# Patient Record
Sex: MALE | Race: WHITE | Employment: OTHER | URBAN - METROPOLITAN AREA
[De-identification: names, ages, dates, MRNs, and addresses within clinical notes are randomized per-mention and may not be internally consistent; named-entity substitution may affect disease eponyms.]

---

## 2017-09-21 ENCOUNTER — OFFICE VISIT (OUTPATIENT)
Dept: HEMATOLOGY | Age: 67
End: 2017-09-21

## 2017-09-21 ENCOUNTER — HOSPITAL ENCOUNTER (OUTPATIENT)
Dept: LAB | Age: 67
Discharge: HOME | End: 2017-09-21
Payer: COMMERCIAL

## 2017-09-21 VITALS
WEIGHT: 247 LBS | HEART RATE: 74 BPM | BODY MASS INDEX: 34.58 KG/M2 | TEMPERATURE: 96.4 F | SYSTOLIC BLOOD PRESSURE: 122 MMHG | OXYGEN SATURATION: 96 % | DIASTOLIC BLOOD PRESSURE: 77 MMHG | HEIGHT: 71 IN | RESPIRATION RATE: 16 BRPM

## 2017-09-21 DIAGNOSIS — B18.2 CHRONIC HEPATITIS C WITHOUT HEPATIC COMA (HCC): ICD-10-CM

## 2017-09-21 DIAGNOSIS — B18.2 CHRONIC HEPATITIS C WITHOUT HEPATIC COMA (HCC): Primary | ICD-10-CM

## 2017-09-21 LAB
ALBUMIN SERPL-MCNC: 3.7 G/DL (ref 3.4–5)
ALBUMIN/GLOB SERPL: 1 {RATIO} (ref 0.8–1.7)
ALP SERPL-CCNC: 82 U/L (ref 45–117)
ALT SERPL-CCNC: 34 U/L (ref 16–61)
AST SERPL-CCNC: 21 U/L (ref 15–37)
BASOPHILS # BLD: 0 K/UL (ref 0–0.06)
BASOPHILS NFR BLD: 1 % (ref 0–2)
BILIRUB DIRECT SERPL-MCNC: 0.1 MG/DL (ref 0–0.2)
BILIRUB SERPL-MCNC: 0.3 MG/DL (ref 0.2–1)
DIFFERENTIAL METHOD BLD: ABNORMAL
EOSINOPHIL # BLD: 0.1 K/UL (ref 0–0.4)
EOSINOPHIL NFR BLD: 2 % (ref 0–5)
ERYTHROCYTE [DISTWIDTH] IN BLOOD BY AUTOMATED COUNT: 12.2 % (ref 11.6–14.5)
GLOBULIN SER CALC-MCNC: 3.7 G/DL (ref 2–4)
HCT VFR BLD AUTO: 42.5 % (ref 36–48)
HGB BLD-MCNC: 14.3 G/DL (ref 13–16)
LYMPHOCYTES # BLD: 2.2 K/UL (ref 0.9–3.6)
LYMPHOCYTES NFR BLD: 34 % (ref 21–52)
MCH RBC QN AUTO: 32.2 PG (ref 24–34)
MCHC RBC AUTO-ENTMCNC: 33.6 G/DL (ref 31–37)
MCV RBC AUTO: 95.7 FL (ref 74–97)
MONOCYTES # BLD: 0.7 K/UL (ref 0.05–1.2)
MONOCYTES NFR BLD: 10 % (ref 3–10)
NEUTS SEG # BLD: 3.5 K/UL (ref 1.8–8)
NEUTS SEG NFR BLD: 53 % (ref 40–73)
PLATELET # BLD AUTO: 258 K/UL (ref 135–420)
PMV BLD AUTO: 10.3 FL (ref 9.2–11.8)
PROT SERPL-MCNC: 7.4 G/DL (ref 6.4–8.2)
RBC # BLD AUTO: 4.44 M/UL (ref 4.7–5.5)
WBC # BLD AUTO: 6.4 K/UL (ref 4.6–13.2)

## 2017-09-21 PROCEDURE — 36415 COLL VENOUS BLD VENIPUNCTURE: CPT | Performed by: NURSE PRACTITIONER

## 2017-09-21 PROCEDURE — 87522 HEPATITIS C REVRS TRNSCRPJ: CPT | Performed by: NURSE PRACTITIONER

## 2017-09-21 PROCEDURE — 80076 HEPATIC FUNCTION PANEL: CPT | Performed by: NURSE PRACTITIONER

## 2017-09-21 PROCEDURE — 85025 COMPLETE CBC W/AUTO DIFF WBC: CPT | Performed by: NURSE PRACTITIONER

## 2017-09-21 NOTE — PROGRESS NOTES
[address] Ewing MD, [place], [place], [place]       Eliza Sprain, NP Gerome Halsted, MILADY Ke[place], [facility]   CASSANDRA Marcos NP        at [address], [address] [address]     [phone]     FAX: [phone]    at [address], [address] [place] [address], [address]     [phone]     FAX: [phone]         Patient Care Team:  Kitty Coronado MD as PCP - General (Family Practice)      Problem List  Date Reviewed: 12/22/2016          Codes Class Noted    Hypertension ICD-10-CM: I10  ICD-9-CM: 401.9  5/10/2016        S/P cholecystectomy ICD-10-CM: Z90.49  ICD-9-CM: V45.79  5/10/2016        HCV (hepatitis C virus) ICD-10-CM: B19.20  ICD-9-CM: 070.70  Unknown    Overview Signed 7/11/2011  2:26 PM by Justin Forrester     Chronic HCV, genotype 1                     Mr. Stephanie Watson returns to the [address] regarding chronic HCV and its management. His active problem list, all pertinent past medical history, medications, liver histology, endoscopic studies, radiologic findings and laboratory findings related to the liver disorder were reviewed with him. The patient is a 73 y.o.  male who was noted to have abnormalities in liver chemistries and subsequently tested positive for chronic HCV in 1990s. The patient was previously seen and treated for HCV in 2011-12. Risk factors for acquiring HCV are IV drug use 1970s. There was no history of acute incteric hepatitis at the time of these risk factors. Several liver biopsies was performed. The last was in 12/2012. This demonstrated no - portal fibrosis. The patient was treated in a clinical trial with peginterferon, ribavirin and an experimental anti-viral agent or possible placebo.     The most recent laboratory studies indicate that the liver transaminases are normal, alkaline phosphatase is normal, tests of hepatic synthetic and metabolic function are normal, and the platelet count is normal.     The patient completed twelve weeks of treatment with Norwood Copper with good toleration on 9/11/16. Treatment start date: 6/20/16. He did not note any side effects and denies missing any doses of medication. The patient has no symptoms which can be attributed to the liver disorder. The patient has moderate limitations in functional activities which can be attributed to other medical problems that are not related to the liver disease. The patient has not experienced fatigue, fevers, chills, shortness of breath, chest pain, pain in the right side over the liver, diffuse abdominal pain, nausea, vomiting, constipation, diarrhrea, dry eyes, dry mouth, arthralgias, myalgias, yellowing of the eyes or skin, itching, dark urine, problems concentrating, swelling of the abdomen, swelling of the lower extremities, hematemesis, or hematochezia. ALLERGIES  No Known Allergies    MEDICATIONS  Current Outpatient Prescriptions   Medication Sig    QUINAPRIL HCL (QUINAPRIL PO) Take  by mouth.  ASPIRIN PO Take  by mouth. No current facility-administered medications for this visit. SYSTEM REVIEW NOT RELATED TO LIVER DISEASE OR REVIEWED ABOVE:  Constitution systems: Negative for fever, chills, weight gain, weight loss. Eyes: Negative for visual changes. ENT: Negative for sore throat, painful swallowing. Respiratory: Negative for cough, hemoptysis, SOB. Cardiology: Negative for chest pain, palpitations. GI:  Negative for constipation or diarrhea. : Negative for urinary frequency, dysuria, hematuria, nocturia. Skin: Negative for rash. Hematology: Negative for easy bruising, blood clots. Musculo-skelatal: Knee pain. Neurologic: Negative for headaches, dizziness, vertigo, memory problems not related to HE.   Psychology: Negative for anxiety, depression. FAMILY HISTORY:  The father had head and neck and prostate cancer. He  in a fire. The mother  of pulmonary embolus. There is no family history of liver disease. SOCIAL HISTORY:  The patient is . The spouse has not been tested for HCV   The patient has 2 children,   The patient has never used tobacco products. The patient consumes alcohol on social occasions never in excess. The patient used to work  of [employer]. The patient retired in . PHYSICAL EXAMINATION:    Visit Vitals    /77 (BP 1 Location: Left arm, BP Patient Position: Sitting)    Pulse 74    Temp 96.4 °F (35.8 °C) (Tympanic)    Resp 16    Ht 5' 11\" (1.803 m)    Wt 247 lb (112 kg)    SpO2 96%    BMI 34.45 kg/m2       General: No acute distress. Eyes: Sclera anicteric. ENT: No oral lesions. Thyroid normal.  Nodes: No adenopathy. Skin: No spider angiomata. No jaundice. No palmar erythema. Respiratory: Lungs clear to auscultation. Cardiovascular: Regular heart rate. No murmurs. No JVD. Abdomen: Soft non-tender. Liver size normal to percussion/palpation. Spleen not palpable. No obvious ascites. Extremities: No edema. No muscle wasting. No gross arthritic changes. Neurologic: Alert and oriented. Cranial nerves grossly intact. No asterixis.     LABORATORY STUDIES:  [facility] of [address] [place] [address] & Units 2016   WBC 4.6 - 13.2 K/uL 6.4 6.9   ANC 1.8 - 8.0 K/UL 3.5 4.1   HGB 13.0 - 16.0 g/dL 14.3 15.2    - 420 K/uL 258 230   AST 15 - 37 U/L 21 23   ALT 16 - 61 U/L 34 33   Alk Phos 45 - 117 U/L 82 91   Bili, Total 0.2 - 1.0 MG/DL 0.3 0.4   Bili, Direct 0.0 - 0.2 MG/DL 0.1 0.1   Albumin 3.4 - 5.0 g/dL 3.7 4.0   BUN 7.0 - 18 MG/DL  16   Creat 0.6 - 1.3 MG/DL  0.88   Na 136 - 145 mmol/L  139   K 3.5 - 5.5 mmol/L  4.6   Cl 100 - 108 mmol/L  101   CO2 21 - 32 mmol/L  28   Glucose 74 - 99 mg/dL  94     Liver [facility] Latest Ref Rng & Units 9/13/2016   WBC 4.6 - 13.2 K/uL 6.1   ANC 1.8 - 8.0 K/UL 3.2   HGB 13.0 - 16.0 g/dL 13.8    - 420 K/uL 237   AST 15 - 37 U/L 22   ALT 16 - 61 U/L 34   Alk Phos 45 - 117 U/L 81   Bili, Total 0.2 - 1.0 MG/DL 0.4   Bili, Direct 0.0 - 0.2 MG/DL 0.1   Albumin 3.4 - 5.0 g/dL 3.8   BUN 7.0 - 18 MG/DL 16   Creat 0.6 - 1.3 MG/DL 0.84   Na 136 - 145 mmol/L 138   K 3.5 - 5.5 mmol/L 3.9   Cl 100 - 108 mmol/L 103   CO2 21 - 32 mmol/L 28   Glucose 74 - 99 mg/dL 81     SEROLOGIES:  Serologies Latest Ref Rng 5/10/2016   Hep A Ab, Total NEGATIVE   Positive (A)   Hep B Surface Ag <1.00 Index <0.10   Hep B Surface Ag Interp NEG   NEGATIVE   Hep B Core Ab, Total NEGATIVE   Positive (A)   Hep B Surface Ab >10.0 mIU/mL <3.10 (L)   Hep B Surface Ab Interp POS   NEGATIVE (A)   Hep C Genotype  1a   HCV RT-PCR, ZGFKJ  [id number]     LIVER HISTOLOGY:  Not available or performed    ENDOSCOPIC PROCEDURES:  Not available or performed    RADIOLOGY:  Not available or performed    OTHER TESTING:  Not available or performed    ASSESSMENT AND PLAN:  Chronic HCV, genotype 1 with mild fibrosis. The most recent laboratory studies indicate that the liver transaminases are normal, alkaline phosphatase is normal, tests of hepatic synthetic and metabolic function are normal, and the platelet count is normal.  HCV RNA is still pending. HCV treatment. He completed twelve weeks of treatment with Vilma Dub with good toleration on 9/11/16. He was SVR 12 in 12/2016. The patient was counseled regarding alcohol consumption. Vaccination for viral hepatitis A and B is not needed. The patient has serologic evidence of prior exposure or vaccination with immunity. All of the above issues were discussed with the patient. All questions were answered. The patient expressed a clear understanding of the above. Follow-up Juan [facility] [address] on a PRN basis.     Yifan Izaguirre NP   Liver [place] [id number] [address], [address] [facility], [address] [place] [address]   [phone]

## 2017-09-21 NOTE — MR AVS SNAPSHOT
Visit Information Date & Time Provider Department Dept. Phone Encounter #  
 9/21/2017 11:30 AM Osmin Liu NP [place] [id number] Follow-up Instructions Return if symptoms worsen or fail to improve. Upcoming Health Maintenance Date Due DTaP/Tdap/Td series (1 - Tdap) 10/4/1972 FOBT Q 1 YEAR AGE 50-75 10/4/2001 ZOSTER VACCINE AGE 60> 8/4/2011 GLAUCOMA SCREENING Q2Y 10/4/2016 Pneumococcal 65+ Low/Medium Risk (1 of 2 - PCV13) 10/4/2016 MEDICARE YEARLY EXAM 10/4/2016 INFLUENZA AGE 9 TO ADULT 8/1/2017 Allergies as of 9/21/2017  Review Complete On: 9/21/2017 By: Troy Guallpa No Known Allergies Current Immunizations  Never Reviewed No immunizations on file. Not reviewed this visit You Were Diagnosed With   
  
 Codes Comments Chronic hepatitis C without hepatic coma (HCC)    -  Primary ICD-10-CM: B18.2 ICD-9-CM: 070.54 Vitals BP Pulse Temp Resp Height(growth percentile) 122/77 (BP 1 Location: Left arm, BP Patient Position: Sitting) 74 96.4 °F (35.8 °C) (Tympanic) 16 5' 11\" (1.803 m) Weight(growth percentile) SpO2 BMI Smoking Status 247 lb (112 kg) 96% 34.45 kg/m2 Never Smoker BMI and BSA Data Body Mass Index Body Surface Area 34.45 kg/m 2 2.37 m 2 Preferred Pharmacy Pharmacy Name Phone [facility] @ [address] [phone] Your Updated Medication List  
  
   
This list is accurate as of: 9/21/17 11:46 AM.  Always use your most recent med list.  
  
  
  
  
 ASPIRIN PO Take  by mouth. QUINAPRIL PO Take  by mouth. Follow-up Instructions Return if symptoms worsen or fail to improve. To-Do List   
 09/21/2017 Lab:  CBC WITH AUTOMATED DIFF   
  
 09/21/2017 Lab:  HCV, QT WITH GRAPH   
  
 09/21/2017   Lab:  HEPATIC FUNCTION PANEL   
  
 09/21/2017 Imaging:  US ABD LTD W ELASTOGRAPHY Introducing [facility]! Dear Curtis Hansen: 
Thank you for requesting a [employer] account. Our records indicate that you already have an active [employer] account. You can access your account anytime at [email or web address] Did you know that you can access your hospital and ER discharge instructions at any time in [employer]? You can also review all of your test results from your hospital stay or ER visit. Additional Information If you have questions, please visit the Frequently Asked Questions section of the [employer] website at [email or web address] Remember, [employer] is NOT to be used for urgent needs. For medical emergencies, dial 911. Now available from your iPhone and Android! Please provide this summary of care documentation to your next provider. Your primary care clinician is listed as Yisel Lux. If you have any questions after today's visit, please call [phone].

## 2017-10-18 LAB
HCV GRAPH, HCVGR: NORMAL
HCV RNA SERPL NAA+PROBE-ACNC: NORMAL IU/ML
SERIAL MONITORING: NORMAL